# Patient Record
Sex: FEMALE | Race: WHITE | NOT HISPANIC OR LATINO | URBAN - NONMETROPOLITAN AREA
[De-identification: names, ages, dates, MRNs, and addresses within clinical notes are randomized per-mention and may not be internally consistent; named-entity substitution may affect disease eponyms.]

---

## 2017-01-04 ENCOUNTER — OFFICE VISIT (OUTPATIENT)
Dept: RETAIL CLINIC | Facility: CLINIC | Age: 47
End: 2017-01-04

## 2017-01-04 VITALS
TEMPERATURE: 98.3 F | DIASTOLIC BLOOD PRESSURE: 66 MMHG | HEART RATE: 104 BPM | RESPIRATION RATE: 16 BRPM | HEIGHT: 64 IN | BODY MASS INDEX: 22.67 KG/M2 | SYSTOLIC BLOOD PRESSURE: 114 MMHG | OXYGEN SATURATION: 99 % | WEIGHT: 132.8 LBS

## 2017-01-04 DIAGNOSIS — J09.X2: Primary | ICD-10-CM

## 2017-01-04 LAB
EXPIRATION DATE: ABNORMAL
EXPIRATION DATE: NORMAL
FLUAV AG NPH QL: ABNORMAL
FLUBV AG NPH QL: ABNORMAL
INTERNAL CONTROL: ABNORMAL
INTERNAL CONTROL: NORMAL
Lab: ABNORMAL
Lab: NORMAL
S PYO AG THROAT QL: NEGATIVE

## 2017-01-04 PROCEDURE — 87804 INFLUENZA ASSAY W/OPTIC: CPT | Performed by: NURSE PRACTITIONER

## 2017-01-04 PROCEDURE — 99213 OFFICE O/P EST LOW 20 MIN: CPT | Performed by: NURSE PRACTITIONER

## 2017-01-04 PROCEDURE — 87880 STREP A ASSAY W/OPTIC: CPT | Performed by: NURSE PRACTITIONER

## 2017-01-04 RX ORDER — BENZONATATE 200 MG/1
200 CAPSULE ORAL EVERY 8 HOURS PRN
Qty: 21 CAPSULE | Refills: 0 | Status: SHIPPED | OUTPATIENT
Start: 2017-01-04 | End: 2017-01-11

## 2017-01-04 RX ORDER — IBUPROFEN 800 MG/1
800 TABLET ORAL EVERY 6 HOURS PRN
Qty: 20 TABLET | Refills: 0 | Status: SHIPPED | OUTPATIENT
Start: 2017-01-04 | End: 2017-01-09

## 2017-01-04 RX ORDER — ONDANSETRON HYDROCHLORIDE 8 MG/1
8 TABLET, FILM COATED ORAL EVERY 8 HOURS PRN
Qty: 9 TABLET | Refills: 0 | Status: SHIPPED | OUTPATIENT
Start: 2017-01-04 | End: 2017-01-07

## 2017-01-04 RX ORDER — OSELTAMIVIR PHOSPHATE 75 MG/1
75 CAPSULE ORAL 2 TIMES DAILY
Qty: 10 CAPSULE | Refills: 0 | Status: SHIPPED | OUTPATIENT
Start: 2017-01-04 | End: 2017-01-09

## 2017-01-04 NOTE — MR AVS SNAPSHOT
Barb Collazo   1/4/2017 2:00 PM   Office Visit    Dept Phone:  [phone]   Encounter #:  [id number]    Provider:  Provider Bec Muse   Department:  [facility]                Your Full Care Plan              Today's Medication Changes          These changes are accurate as of: 1/4/17  2:24 PM.  If you have any questions, ask your nurse or doctor.               New Medication(s)Ordered:     benzonatate 200 MG capsule   Commonly known as:  TESSALON   Take 1 capsule by mouth Every 8 (Eight) Hours As Needed for cough for up to 7 days.   Started by:  Seema Dc       ibuprofen 800 MG tablet   Commonly known as:  ADVIL,MOTRIN   Take 1 tablet by mouth Every 6 (Six) Hours As Needed for moderate pain (4-6) or fever for up to 5 days.   Started by:  Seema Dc       ondansetron 8 MG tablet   Commonly known as:  ZOFRAN   Take 1 tablet by mouth Every 8 (Eight) Hours As Needed for nausea or vomiting for up to 3 days.   Started by:  Seema Dc       oseltamivir 75 MG capsule   Commonly known as:  TAMIFLU   Take 1 capsule by mouth 2 (Two) Times a Day for 5 days.   Started by:  Seema Dc            Where to Get Your Medications      These medications were sent to [facility] - [place] [address] [address] - [phone]  - [phone]   [address] [address]     Phone:  [phone]     benzonatate 200 MG capsule    ibuprofen 800 MG tablet    ondansetron 8 MG tablet    oseltamivir 75 MG capsule                  Your Updated Medication List          This list is accurate as of: 1/4/17  2:24 PM.  Always use your most recent med list.                benzonatate 200 MG capsule   Commonly known as:  TESSALON   Take 1 capsule by mouth Every 8 (Eight) Hours As Needed for cough for up to 7 days.       ibuprofen 800 MG tablet   Commonly known as:  ADVIL,MOTRIN   Take 1 tablet by mouth Every 6 (Six) Hours As Needed for  "moderate pain (4-6) or fever for up to 5 days.       LYSINE PO       ondansetron 8 MG tablet   Commonly known as:  ZOFRAN   Take 1 tablet by mouth Every 8 (Eight) Hours As Needed for nausea or vomiting for up to 3 days.       oseltamivir 75 MG capsule   Commonly known as:  TAMIFLU   Take 1 capsule by mouth 2 (Two) Times a Day for 5 days.       SPIRONOLACTONE PO       WELLBUTRIN PO               You Were Diagnosed With        Codes Comments    Influenza A/H5N1    -  Primary ICD-10-CM: J09.X2  ICD-9-CM: 488.02       Instructions    You have tested positive today for influenza or \"the flu.\" Because your symptoms began less than 48 hours ago, you are being treated with Tamiflu. This medication will not cure the flu, but it may help with reducing the severity and duration of the symptoms. The flu is a viral illness, and can last 10-14 days before it resolves. Symptomatic treatment is recommended - antibiotics will not help. Take Ibuprofen or Tylenol as needed for fever. Mucinex or Robitussion may help with clearing cough and congestion. Increase your intake of clear, decaffeinated fluids and get plenty of rest. For fever that persists beyond 5 days or worsening symptoms, follow up with your primary care provider. Patient verbalized understanding, visit summary given.    JODI Carlson       Patient Instructions History      Upcoming Appointments     Visit Type Date Time Department    OFFICE VISIT 2017  2:00 PM [facility] [place]      [employer] Signup     [facility] [employer] allows you to send messages to your doctor, view your test results, renew your prescriptions, schedule appointments, and more. To sign up, go to [employer] and click on the Sign Up Now link in the New User? box. Enter your [employer] Activation Code exactly as it appears below along with the last four digits of your Social Security Number and your Date of Birth () to complete the sign-up process. If you do not sign up before " "the expiration date, you must request a new code.    [employer] Activation Code: [email or web address]  Expires: 1/18/2017  2:24 PM    If you have questions, you can email [email or web address] or call [phone] to talk to our [employer] staff. Remember, [employer] is NOT to be used for urgent needs. For medical emergencies, dial 911.               Other Info from Your Visit           Allergies     No Known Allergies      Vital Signs     Blood Pressure Pulse Temperature Respirations Height Weight    114/66 (BP Location: Right arm) 104 98.3 °F (36.8 °C) (Oral) 16 64\" (162.6 cm) 132 lb 12.8 oz (60.2 kg)    Last Menstrual Period Oxygen Saturation Body Mass Index Smoking Status          12/20/2016 99% 22.8 kg/m2 Never Smoker        Problems and Diagnoses Noted     Influenza A/H5N1    -  Primary        "

## 2017-01-04 NOTE — PROGRESS NOTES
"Subjective   Barb Collazo is a 46 y.o. female.     HPI Comments: Patient reports a sudden onset 2 days ago of cough and congestion but significantly worse yesterday with severe sore throat, fever not checked, body aches and nausea without vomiting. Taking claritan and tylenol for some mild relief. Last Tylenol 90 minutes ago. No flu shot this season. Crying at times due to level of illness.Worst symptom is sore throat.        No Known Allergies      The following portions of the patient's history were reviewed and updated as appropriate: allergies, current medications, past family history, past medical history, past social history, past surgical history and problem list.    Review of Systems   Constitutional: Positive for activity change, chills, diaphoresis, fatigue and fever.   HENT: Positive for congestion, sore throat and trouble swallowing. Negative for ear pain, postnasal drip and sinus pressure.    Respiratory: Positive for cough.    Musculoskeletal: Positive for myalgias.   Skin: Negative for rash.   Neurological: Positive for headaches.       Objective     Visit Vitals   • /66 (BP Location: Right arm)   • Pulse 104   • Temp 98.3 °F (36.8 °C) (Oral)   • Resp 16   • Ht 64\" (162.6 cm)   • Wt 132 lb 12.8 oz (60.2 kg)   • LMP 12/20/2016   • SpO2 99%   • BMI 22.8 kg/m2       Physical Exam  General Appearance:  Ill-appearing, well-developed, well hydrated, well nourished, no acute distress, alert and oriented, appears stated age, uncomfortable, tearful at times, mild cough in office, cooperative  Head/face:  Normocephalic, atraumatic  Eyes:  PERRLA, EOMI, no conjunctivitis or icterus  Ears:  Bilateral TMs are pearly gray with light reflex and landmarks evident, canals are clear, no pinna or tragus tenderness with palpation  Nose/Sinuses:  Nares are mildly congested bilaterally  Mouth/Throat:  Posterior pharynx is moderately erythematous with yellow drainage  Neck:  1 cm bilateral tonsillar adenopathy right " tender  Lungs:  Clear to auscultation bilaterally  Heart:  Regular rate and rhythm without murmur, gallop or rub  Neurologic:  Alert; no focal deficits; age appropriate behavior and speech  POC Influenza A / B   Order: [id number]   Status:  Final result Visible to patient:  No (Not Released) Dx:  Influenza A/H5N1          Newer results are available. Click to view them now.            Ref Range & Units 2:27 PM     Rapid Influenza A Ag  pos   Rapid Influenza B Ag  neg         POC Rapid Strep A   Order: [id number]   Status:  Final result Visible to patient:  No (Not Released) Dx:  Influenza A/H5N1         Ref Range & Units 2:28 PM   2:27 PM      Rapid Strep A Screen Negative, VALID, INVALID, Not Performed Negative                Assessment/Plan   Diagnoses and all orders for this visit:    Influenza Type A    Other orders  -     oseltamivir (TAMIFLU) 75 MG capsule; Take 1 capsule by mouth 2 (Two) Times a Day for 5 days.  -     ondansetron (ZOFRAN) 8 MG tablet; Take 1 tablet by mouth Every 8 (Eight) Hours As Needed for nausea or vomiting for up to 3 days.  -     ibuprofen (ADVIL,MOTRIN) 800 MG tablet; Take 1 tablet by mouth Every 6 (Six) Hours As Needed for moderate pain (4-6) or fever for up to 5 days.  -     benzonatate (TESSALON) 200 MG capsule; Take 1 capsule by mouth Every 8 (Eight) Hours As Needed for cough for up to 7 days.    Discussed dosing, side effects, recommended other symptomatic care.  Patient instructions and visit summary given as documented. Patient should follow up with primary care provider if symptoms worsen, fail to resolve or other symptoms need attention. Patient/family agree to the above.            JODI Carlson

## 2017-01-04 NOTE — PATIENT INSTRUCTIONS
"You have tested positive today for influenza or \"the flu.\" Because your symptoms began less than 48 hours ago, you are being treated with Tamiflu. This medication will not cure the flu, but it may help with reducing the severity and duration of the symptoms. The flu is a viral illness, and can last 10-14 days before it resolves. Symptomatic treatment is recommended - antibiotics will not help. Take Ibuprofen or Tylenol as needed for fever. Mucinex or Robitussion may help with clearing cough and congestion. Increase your intake of clear, decaffeinated fluids and get plenty of rest. For fever that persists beyond 5 days or worsening symptoms, follow up with your primary care provider. Patient verbalized understanding, visit summary given.    JODI Carlson    "